# Patient Record
Sex: MALE | Race: WHITE | Employment: STUDENT | URBAN - METROPOLITAN AREA
[De-identification: names, ages, dates, MRNs, and addresses within clinical notes are randomized per-mention and may not be internally consistent; named-entity substitution may affect disease eponyms.]

---

## 2018-05-31 ENCOUNTER — OFFICE VISIT (OUTPATIENT)
Dept: PEDIATRICS | Facility: CLINIC | Age: 23
End: 2018-05-31
Payer: COMMERCIAL

## 2018-05-31 VITALS
WEIGHT: 154 LBS | BODY MASS INDEX: 22.05 KG/M2 | TEMPERATURE: 98.5 F | SYSTOLIC BLOOD PRESSURE: 100 MMHG | DIASTOLIC BLOOD PRESSURE: 60 MMHG | OXYGEN SATURATION: 96 % | HEART RATE: 87 BPM | HEIGHT: 70 IN

## 2018-05-31 DIAGNOSIS — Z00.01 ENCOUNTER FOR ROUTINE ADULT HEALTH EXAMINATION WITH ABNORMAL FINDINGS: Primary | ICD-10-CM

## 2018-05-31 DIAGNOSIS — R09.82 POST-NASAL DRIP: ICD-10-CM

## 2018-05-31 DIAGNOSIS — J30.2 SEASONAL ALLERGIC RHINITIS, UNSPECIFIED CHRONICITY, UNSPECIFIED TRIGGER: ICD-10-CM

## 2018-05-31 DIAGNOSIS — Z13.6 CARDIOVASCULAR SCREENING; LDL GOAL LESS THAN 160: ICD-10-CM

## 2018-05-31 DIAGNOSIS — D36.7 DERMOID CYST OF LEG, LEFT: ICD-10-CM

## 2018-05-31 DIAGNOSIS — J45.20 MILD INTERMITTENT ASTHMA WITHOUT COMPLICATION: ICD-10-CM

## 2018-05-31 PROCEDURE — 99385 PREV VISIT NEW AGE 18-39: CPT | Performed by: INTERNAL MEDICINE

## 2018-05-31 PROCEDURE — 99213 OFFICE O/P EST LOW 20 MIN: CPT | Mod: 25 | Performed by: INTERNAL MEDICINE

## 2018-05-31 RX ORDER — FLUTICASONE PROPIONATE 50 MCG
1 SPRAY, SUSPENSION (ML) NASAL DAILY
Qty: 1 BOTTLE | Refills: 3 | Status: SHIPPED | OUTPATIENT
Start: 2018-05-31

## 2018-05-31 RX ORDER — MONTELUKAST SODIUM 10 MG/1
10 TABLET ORAL AT BEDTIME
COMMUNITY
End: 2020-03-31

## 2018-05-31 ASSESSMENT — ENCOUNTER SYMPTOMS
DIZZINESS: 0
COUGH: 0
HEARTBURN: 0
CONSTIPATION: 0
PARESTHESIAS: 0
PALPITATIONS: 0
HEMATURIA: 0
ABDOMINAL PAIN: 0
NERVOUS/ANXIOUS: 0
DIARRHEA: 0
CHILLS: 0
JOINT SWELLING: 0
MYALGIAS: 0
HEMATOCHEZIA: 0
HEADACHES: 0
DYSURIA: 0
EYE PAIN: 0
NAUSEA: 0
ARTHRALGIAS: 0
SHORTNESS OF BREATH: 0
WEAKNESS: 0
FEVER: 0
SORE THROAT: 0
FREQUENCY: 0

## 2018-05-31 NOTE — PATIENT INSTRUCTIONS
INSTRUCTIONS FOR TODAY:     cyst-schedule visit with Surgery   post nasal drip-continue Allegra, add flonase   schedule fasting labdraw       Dr Noble    Preventive Health Recommendations  Male Ages 18 - 25     Yearly exam:             See your health care provider every year in order to  o   Review health changes.   o   Discuss preventive care.    o   Review your medicines if your doctor has prescribed any.    You should be tested each year for STDs (sexually transmitted diseases).     Talk to your provider about cholesterol testing.      If you are at risk for diabetes, you should have a diabetes test (fasting glucose).    Shots: Get a flu shot each year. Get a tetanus shot every 10 years.     Nutrition:    Eat at least 5 servings of fruits and vegetables daily.     Eat whole-grain bread, whole-wheat pasta and brown rice instead of white grains and rice.     Talk to your provider about calcium and Vitamin D.     Lifestyle    Exercise for at least 150 minutes a week (30 minutes a day, 5 days a week). This will help you control your weight and prevent disease.     Limit alcohol to one drink per day.     No smoking.     Wear sunscreen to prevent skin cancer.     See your dentist every six months for an exam and cleaning.

## 2018-05-31 NOTE — LETTER
My Asthma Action Plan  Name: Walter Mejia   YOB: 1995  Date: 5/31/2018   My doctor: Ezekiel Noble MD, MD   My clinic: [facility] [place]        My Control Medicine: none  My Rescue Medicine: albuterol   My Asthma Severity: intermittent  your asthma triggers: exercise               GREEN ZONE   Good Control    I feel good    No cough or wheeze    Can work, sleep and play without asthma symptoms       Take your asthma control medicine every day.     1. If exercise triggers your asthma, take your rescue medication    15 minutes before exercise or sports, and    During exercise if you have asthma symptoms  2. Spacer to use with inhaler: If you have a spacer, make sure to use it with your inhaler             YELLOW ZONE Getting Worse  I have ANY of these:    I do not feel good    Cough or wheeze    Chest feels tight    Wake up at night   1. Keep taking your Green Zone medications  2. Start taking your rescue medicine:    every 20 minutes for up to 1 hour. Then every 4 hours for 24-48 hours.  3. If you stay in the Yellow Zone for more than 12-24 hours, contact your doctor.  4. If you do not return to the Green Zone in 12-24 hours or you get worse, start taking your oral steroid medicine if prescribed by your provider.           RED ZONE Medical Alert - Get Help  I have ANY of these:    I feel awful    Medicine is not helping    Breathing getting harder    Trouble walking or talking    Nose opens wide to breathe       1. Take your rescue medicine NOW  2. If your provider has prescribed an oral steroid medicine, start taking it NOW  3. Call your doctor NOW  4. If you are still in the Red Zone after 20 minutes and you have not reached your doctor:    Take your rescue medicine again and    Call 911 or go to the emergency room right away    See your regular doctor within 2 weeks of an Emergency Room or Urgent Care visit for follow-up treatment.          Annual Reminders:  Meet with Asthma Educator,  Flu Shot  in the Fall, consider Pneumonia Vaccination for patients with asthma (aged 19 and older).    Pharmacy:    [id number] - [place], [place] - [address] [id number], [place] - [address] AT [facility] & [place] [address]                      Asthma Triggers  How To Control Things That Make Your Asthma Worse    Triggers are things that make your asthma worse.  Look at the list below to help you find your triggers and what you can do about them.  You can help prevent asthma flare-ups by staying away from your triggers.      Trigger                                                          What you can do   Cigarette Smoke  Tobacco smoke can make asthma worse. Do not allow smoking in your home, car or around you.  Be sure no one smokes at a child s day care or school.  If you smoke, ask your health care provider for ways to help you quit.  Ask family members to quit too.  Ask your health care provider for a referral to Quit Plan to help you quit smoking, or call 9-715-446-PLAN.     Colds, Flu, Bronchitis  These are common triggers of asthma. Wash your hands often.  Don t touch your eyes, nose or mouth.  Get a flu shot every year.     Dust Mites  These are tiny bugs that live in cloth or carpet. They are too small to see. Wash sheets and blankets in hot water every week.   Encase pillows and mattress in dust mite proof covers.  Avoid having carpet if you can. If you have carpet, vacuum weekly.   Use a dust mask and HEPA vacuum.   Pollen and Outdoor Mold  Some people are allergic to trees, grass, or weed pollen, or molds. Try to keep your windows closed.  Limit time out doors when pollen count is high.   Ask you health care provider about taking medicine during allergy season.     Animal Dander  Some people are allergic to skin flakes, urine or saliva from pets with fur or feathers. Keep pets with fur or feathers out of your home.    If you can t  keep the pet outdoors, then keep the pet out of your bedroom.  Keep the bedroom door closed.  Keep pets off cloth furniture and away from stuffed toys.     Mice, Rats, and Cockroaches  Some people are allergic to the waste from these pests.   Cover food and garbage.  Clean up spills and food crumbs.  Store grease in the refrigerator.   Keep food out of the bedroom.   Indoor Mold  This can be a trigger if your home has high moisture. Fix leaking faucets, pipes, or other sources of water.   Clean moldy surfaces.  Dehumidify basement if it is damp and smelly.   Smoke, Strong Odors, and Sprays  These can reduce air quality. Stay away from strong odors and sprays, such as perfume, powder, hair spray, paints, smoke incense, paint, cleaning products, candles and new carpet.   Exercise or Sports  Some people with asthma have this trigger. Be active!  Ask your doctor about taking medicine before sports or exercise to prevent symptoms.    Warm up for 5-10 minutes before and after sports or exercise.     Other Triggers of Asthma  Cold air:  Cover your nose and mouth with a scarf.  Sometimes laughing or crying can be a trigger.  Some medicines and food can trigger asthma.

## 2018-05-31 NOTE — PROGRESS NOTES
SUBJECTIVE:   CC: Walter Mejia is an 22 year old male who presents for preventative health visit.     Physical   Annual:     Getting at least 3 servings of Calcium per day::  Yes    Bi-annual eye exam::  Yes    Dental care twice a year::  NO    Sleep apnea or symptoms of sleep apnea::  None    Diet::  Regular (no restrictions)    Frequency of exercise::  4-5 days/week    Duration of exercise::  45-60 minutes    Taking medications regularly::  Yes    Medication side effects::  None    Additional concerns today::  YES            Additional concerns today:  1- lump on lower leg present for several years.  Denies associated trauma.  The lesion is not painful and has not increased in size.  2- complains of ongoing problems with phlegm in the back of his throat over the past 1-2 months.  Prior history of asthma and allergic rhinitis.  Has continued fexofenadine and Singulair without much improvement.  3-history of intermittent asthma.  Uses albuterol as needed prior to exercise, infrequent exacerbations.    Today's PHQ-2 Score:   PHQ-2 ( 1999 Pfizer) 5/31/2018   Q1: Little interest or pleasure in doing things 0   Q2: Feeling down, depressed or hopeless 0   PHQ-2 Score 0   Q1: Little interest or pleasure in doing things Not at all   Q2: Feeling down, depressed or hopeless Not at all   PHQ-2 Score 0       Abuse: Current or Past(Physical, Sexual or Emotional)- No  Do you feel safe in your environment - Yes    Social History   Substance Use Topics     Smoking status: Never Smoker     Smokeless tobacco: Never Used     Alcohol use Yes     Alcohol Use 5/31/2018   If you drink alcohol do you typically have greater than 3 drinks per day OR greater than 7 drinks per week? No     Reviewed orders with patient. Reviewed health maintenance and updated orders accordingly - Yes      Reviewed and updated as needed this visit by clinical staff  Tobacco  Allergies  Meds  Soc Hx        Reviewed and updated as needed this visit by  "Provider          Patient Active Problem List   Diagnosis     CARDIOVASCULAR SCREENING; LDL GOAL LESS THAN 160     Seasonal allergic rhinitis, unspecified chronicity, unspecified trigger     Mild intermittent asthma without complication     No past medical history on file.    No past surgical history on file.    Family History   Problem Relation Age of Onset     Hyperlipidemia Father      Coronary Artery Disease No family hx of      Colon Cancer No family hx of      Prostate Cancer No family hx of        ALLERGIES     Allergies   Allergen Reactions     Augmentin      Upset stomach       Review of Systems   Constitutional: Negative for chills and fever.   HENT: Positive for congestion. Negative for ear pain, hearing loss and sore throat.    Eyes: Negative for pain and visual disturbance.   Respiratory: Negative for cough and shortness of breath.    Cardiovascular: Negative for chest pain, palpitations and peripheral edema.   Gastrointestinal: Negative for abdominal pain, constipation, diarrhea, heartburn, hematochezia and nausea.   Genitourinary: Negative for discharge, dysuria, frequency, genital sores, hematuria, impotence and urgency.   Musculoskeletal: Negative for arthralgias, joint swelling and myalgias.   Skin: Negative for rash.   Neurological: Negative for dizziness, weakness, headaches and paresthesias.   Psychiatric/Behavioral: Negative for mood changes. The patient is not nervous/anxious.          OBJECTIVE:   /60 (Cuff Size: Adult Regular)  Pulse 87  Temp 98.5  F (36.9  C) (Oral)  Ht 5' 10\" (1.778 m)  Wt 154 lb (69.9 kg)  SpO2 96%  BMI 22.1 kg/m2    Physical Exam  GENERAL: alert and no distress  EYES: Eyes grossly normal to inspection, PERRL and conjunctivae and sclerae normal  HENT: ear canals and TM's normal, nose and mouth without ulcers or lesions  NECK: no adenopathy, no asymmetry, masses, or scars and thyroid normal to palpation  RESP: lungs clear to auscultation - no rales, rhonchi or " "wheezes  CV: regular rate and rhythm, normal S1 S2, no S3 or S4, no murmur, click or rub, no peripheral edema and peripheral pulses strong  ABDOMEN: soft, nontender, no hepatosplenomegaly, no masses and bowel sounds normal  MS: no gross musculoskeletal defects noted, no edema  SKIN: no suspicious rashes, lower leg: Approximately 2 cm nontender mobile subcutaneous mass, nonfluctuant without erythema  NEURO: Normal strength and tone, mentation intact and speech normal  PSYCH: mentation appears normal, affect normal/bright    ASSESSMENT/PLAN:       ICD-10-CM    1. Encounter for routine adult health examination with abnormal findings Z00.01        2. Post-nasal drip R09.82 fluticasone (FLONASE) 50 MCG/ACT spray      Suspect postnasal drip.  Continue fexofenadine, add fluticasone     3. Dermoid cyst of leg, left D23.72 GENERAL SURG ADULT REFERRAL      Suspect sebaceous versus dermoid cyst.  Diagnosis discussed, referred to general surgery for possible excision     4. Mild intermittent asthma without complication J45.20  continue albuterol as needed, discussed indications for adding inhaled steroid.     5. Seasonal allergic rhinitis, unspecified chronicity, unspecified trigger J30.2  continue Singulair       6. CARDIOVASCULAR SCREENING; LDL GOAL LESS THAN 160 Z13.6 Lipid panel reflex to direct LDL Fasting     Basic metabolic panel       COUNSELING:   Reviewed preventive health counseling, as reflected in patient instructions       Regular exercise       Healthy diet/nutrition       Colon cancer screening       Prostate cancer screening         reports that he has never smoked. He has never used smokeless tobacco.    Estimated body mass index is 22.1 kg/(m^2) as calculated from the following:    Height as of this encounter: 5' 10\" (1.778 m).    Weight as of this encounter: 154 lb (69.9 kg).       Counseling Resources:  ATP IV Guidelines  Pooled Cohorts Equation Calculator  FRAX Risk Assessment  ICSI Preventive " Guidelines  Dietary Guidelines for Americans, 2010  USDA's MyPlate  ASA Prophylaxis  Lung CA Screening    Ezekiel Noble MD, MD  [facility]

## 2018-05-31 NOTE — TELEPHONE ENCOUNTER
Duplicate.     fluticasone (FLONASE) 50 MCG/ACT spray was filled on 5/31/2018, qty 1 bottle with 3 refills.

## 2018-05-31 NOTE — MR AVS SNAPSHOT
After Visit Summary   5/31/2018    Waltre Mejia    MRN: [id number]           Patient Information     Date Of Birth          1995        Visit Information        Provider Department      5/31/2018 11:20 AM Ezekiel Noble MD [facility]        Today's Diagnoses     Encounter for routine adult health examination with abnormal findings    -  1    CARDIOVASCULAR SCREENING; LDL GOAL LESS THAN 160        Mild intermittent asthma without complication        Seasonal allergic rhinitis, unspecified chronicity, unspecified trigger        Post-nasal drip        Dermoid cyst of leg, left          Care Instructions    INSTRUCTIONS FOR TODAY:     cyst-schedule visit with Surgery   post nasal drip-continue Allegra, add flonase   schedule fasting labdraw       Dr Noble    Preventive Health Recommendations  Male Ages 18 - 25     Yearly exam:             See your health care provider every year in order to  o   Review health changes.   o   Discuss preventive care.    o   Review your medicines if your doctor has prescribed any.    You should be tested each year for STDs (sexually transmitted diseases).     Talk to your provider about cholesterol testing.      If you are at risk for diabetes, you should have a diabetes test (fasting glucose).    Shots: Get a flu shot each year. Get a tetanus shot every 10 years.     Nutrition:    Eat at least 5 servings of fruits and vegetables daily.     Eat whole-grain bread, whole-wheat pasta and brown rice instead of white grains and rice.     Talk to your provider about calcium and Vitamin D.     Lifestyle    Exercise for at least 150 minutes a week (30 minutes a day, 5 days a week). This will help you control your weight and prevent disease.     Limit alcohol to one drink per day.     No smoking.     Wear sunscreen to prevent skin cancer.     See your dentist every six months for an exam and cleaning.             Follow-ups after your visit        Additional  Services     GENERAL SURG ADULT REFERRAL       Your provider has referred you to: [facility]: [facility] - [place] [phone]   [email or web address]    Please be aware that coverage of these services is subject to the terms and limitations of your health insurance plan.  Call member services at your health plan with any benefit or coverage questions.      Please bring the following with you to your appointment:    (1) Any X-Rays, CTs or MRIs which have been performed.  Contact the facility where they were done to arrange for  prior to your scheduled appointment.   (2) List of current medications   (3) This referral request   (4) Any documents/labs given to you for this referral                  Future tests that were ordered for you today     Open Future Orders        Priority Expected Expires Ordered    Lipid panel reflex to direct LDL Fasting Routine  7/31/2018 5/31/2018    Basic metabolic panel Routine  7/31/2018 5/31/2018            Who to contact     If you have questions or need follow up information about today's clinic visit or your schedule please contact [facility] [place] directly at [phone].  Normal or non-critical lab and imaging results will be communicated to you by MyChart, letter or phone within 4 business days after the clinic has received the results. If you do not hear from us within 7 days, please contact the clinic through MyChart or phone. If you have a critical or abnormal lab result, we will notify you by phone as soon as possible.  Submit refill requests through [employer] or call your pharmacy and they will forward the refill request to us. Please allow 3 business days for your refill to be completed.          Additional Information About Your Visit        Care EveryWhere ID     This is your Care EveryWhere ID. This could be used by other organizations to access your [facility] medical records  [id number]        Your Vitals  "Were     Pulse Temperature Height Pulse Oximetry BMI (Body Mass Index)       87 98.5  F (36.9  C) (Oral) 5' 10\" (1.778 m) 96% 22.1 kg/m2        Blood Pressure from Last 3 Encounters:   05/31/18 100/60   10/17/15 104/83    Weight from Last 3 Encounters:   05/31/18 154 lb (69.9 kg)   03/13/08 111 lb 8 oz (50.6 kg) (53 %)*   08/12/07 98 lb 8 oz (44.7 kg) (41 %)*     * Growth percentiles are based on [facility] 2-20 Years data.              We Performed the Following     GENERAL SURG ADULT REFERRAL          Today's Medication Changes          These changes are accurate as of 5/31/18 11:53 AM.  If you have any questions, ask your nurse or doctor.               Start taking these medicines.        Dose/Directions    fluticasone 50 MCG/ACT spray   Commonly known as:  FLONASE   Used for:  Post-nasal drip   Started by:  Ezekiel Noble MD        Dose:  1 spray   Spray 1 spray into both nostrils daily   Quantity:  1 Bottle   Refills:  3         These medicines have changed or have updated prescriptions.        Dose/Directions    montelukast 10 MG tablet   Commonly known as:  SINGULAIR   This may have changed:  Another medication with the same name was removed. Continue taking this medication, and follow the directions you see here.   Changed by:  Ezekiel Noble MD        Dose:  10 mg   Take 10 mg by mouth At Bedtime   Refills:  0         Stop taking these medicines if you haven't already. Please contact your care team if you have questions.     azithromycin 500 MG tablet   Commonly known as:  ZITHROMAX   Stopped by:  Ezekiel Noble MD           CLARITIN PO   Stopped by:  Ezekiel Noble MD           FLOVENT IN   Stopped by:  Ezekiel Noble MD           IBUPROFEN   Stopped by:  Ezekiel Noble MD           MOTRIN PO   Stopped by:  Ezekiel Noble MD           TYLENOL 325 MG tablet   Generic drug:  acetaminophen   Stopped by:  Ezekiel Noble MD                Where to get your medicines    "   These medications were sent to [id number] - [place], [place] - [address] AT [address]  [address] [place] [address]     Phone:  [phone]     fluticasone 50 MCG/ACT spray                Primary Care Provider Fax #    Physician No Ref-Primary [phone]       No address on file        Equal Access to Services     [facility]: Hadii aad ku hadasho Soomaali, waaxda luqadaha, qaybta kaalmada adeegyada, waxay idiin hayaan adeeg kharash la'aan . So [facility] [phone].    ATENCIÓN: Si habla español, tiene a valladares disposición servicios gratuitos de asistencia lingüística. [facility] [phone].    We comply with applicable federal civil rights laws and [place] laws. We do not discriminate on the basis of race, color, national origin, age, disability, sex, sexual orientation, or gender identity.            Thank you!     Thank you for choosing [facility]  for your care. Our goal is always to provide you with excellent care. Hearing back from our patients is one way we can continue to improve our services. Please take a few minutes to complete the written survey that you may receive in the mail after your visit with us. Thank you!             Your Updated Medication List - Protect others around you: Learn how to safely use, store and throw away your medicines at www.disposemymeds.org.          This list is accurate as of 5/31/18 11:53 AM.  Always use your most recent med list.                   Brand Name Dispense Instructions for use Diagnosis    Albuterol Sulfate 108 (90 Base) MCG/ACT Aepb           fluticasone 50 MCG/ACT spray    FLONASE    1 Bottle    Spray 1 spray into both nostrils daily    Post-nasal drip       montelukast 10 MG tablet    SINGULAIR     Take 10 mg by mouth At Bedtime

## 2018-06-05 RX ORDER — FLUTICASONE PROPIONATE 50 MCG
SPRAY, SUSPENSION (ML) NASAL
Qty: 48 ML | Refills: 3 | OUTPATIENT
Start: 2018-06-05

## 2018-06-07 DIAGNOSIS — Z13.6 CARDIOVASCULAR SCREENING; LDL GOAL LESS THAN 160: ICD-10-CM

## 2018-06-07 LAB
ANION GAP SERPL CALCULATED.3IONS-SCNC: 4 MMOL/L (ref 3–14)
BUN SERPL-MCNC: 15 MG/DL (ref 7–30)
CALCIUM SERPL-MCNC: 9.1 MG/DL (ref 8.5–10.1)
CHLORIDE SERPL-SCNC: 106 MMOL/L (ref 94–109)
CHOLEST SERPL-MCNC: 177 MG/DL
CO2 SERPL-SCNC: 31 MMOL/L (ref 20–32)
CREAT SERPL-MCNC: 0.91 MG/DL (ref 0.66–1.25)
GFR SERPL CREATININE-BSD FRML MDRD: >90 ML/MIN/1.7M2
GLUCOSE SERPL-MCNC: 94 MG/DL (ref 70–99)
HDLC SERPL-MCNC: 37 MG/DL
LDLC SERPL CALC-MCNC: 118 MG/DL
NONHDLC SERPL-MCNC: 140 MG/DL
POTASSIUM SERPL-SCNC: 3.9 MMOL/L (ref 3.4–5.3)
SODIUM SERPL-SCNC: 141 MMOL/L (ref 133–144)
TRIGL SERPL-MCNC: 112 MG/DL

## 2018-06-07 PROCEDURE — 80048 BASIC METABOLIC PNL TOTAL CA: CPT | Performed by: INTERNAL MEDICINE

## 2018-06-07 PROCEDURE — 36415 COLL VENOUS BLD VENIPUNCTURE: CPT | Performed by: INTERNAL MEDICINE

## 2018-06-07 PROCEDURE — 80061 LIPID PANEL: CPT | Performed by: INTERNAL MEDICINE

## 2018-06-15 ENCOUNTER — OFFICE VISIT (OUTPATIENT)
Dept: SURGERY | Facility: CLINIC | Age: 23
End: 2018-06-15
Payer: COMMERCIAL

## 2018-06-15 ENCOUNTER — TELEPHONE (OUTPATIENT)
Dept: SURGERY | Facility: CLINIC | Age: 23
End: 2018-06-15

## 2018-06-15 VITALS
BODY MASS INDEX: 22.19 KG/M2 | HEART RATE: 87 BPM | OXYGEN SATURATION: 96 % | RESPIRATION RATE: 16 BRPM | WEIGHT: 155 LBS | SYSTOLIC BLOOD PRESSURE: 100 MMHG | HEIGHT: 70 IN | DIASTOLIC BLOOD PRESSURE: 62 MMHG

## 2018-06-15 DIAGNOSIS — R22.42 LOWER LEG MASS, LEFT: Primary | ICD-10-CM

## 2018-06-15 PROCEDURE — 99203 OFFICE O/P NEW LOW 30 MIN: CPT | Performed by: SURGERY

## 2018-06-15 NOTE — PROGRESS NOTES
I'm asked to see this patient by Dr. Ezekiel Noble regarding a lump on his left lower leg.  This has been present for about a year.  It does not cause pain.  The patient is not aware of a changing in size.  He is not sure if it was this size when it first arose.  It has never been inflamed.  The patient does have a history of asthma.    Past Medical History:   Diagnosis Date     Uncomplicated asthma      Past Surgical History:   Procedure Laterality Date     TONSILLECTOMY  2008     Social History     Social History     Marital status: Single     Spouse name: N/A     Number of children: N/A     Years of education: N/A     Occupational History     Not on file.     Social History Main Topics     Smoking status: Never Smoker     Smokeless tobacco: Never Used     Alcohol use Yes     Drug use: No     Sexual activity: No     Other Topics Concern     Not on file     Social History Narrative     10 point review of systems is negative other than those issues noted above.    Physical exam:  The patient is in no apparent distress.  Breathing is nonlabored.  Left lower extremity reveals a 2 cm raised lump.  The overlying skin is essentially normal.  There is no identifiable punctum.  This is a bit firmer than would be expected from a lipoma or fatty mass.    Assessment and plan: This is a patient with an apparent solid mass in the left lateral lower leg.  This hasn't been changing significantly, and very likely represents a benign lesion, but a locally recurrent neoplasm needs to be ruled out.  I have recommended excision under local anesthesia.  We will work on getting this scheduled for the patient in the near future.  We discussed the procedure, along with its risks and complications, in detail.  The patient has agreed to proceed.    Adalberto Stein MD  [facility]    Please route or send letter to:  Primary Care Provider (PCP)

## 2018-06-15 NOTE — MR AVS SNAPSHOT
"              After Visit Summary   6/15/2018    Walter Mejia    MRN: [id number]           Patient Information     Date Of Birth          1995        Visit Information        Provider Department      6/15/2018 9:45 AM Adalberto Stein MD [facility] [place] [facility] General Surgery      Today's Diagnoses     Lower leg mass, left    -  1       Follow-ups after your visit        Your next 10 appointments already scheduled     Jun 26, 2018   Procedure with Adalberto Stein MD   [facility] PeriOp Services (--)    [address]  [facility] [address]   [phone]            Jun 26, 2018 12:30 PM CDT   [facility] Same Day Surgery with Adalberto Stein MD   [facility] ([facility])    [facility] ([facility])   [phone]              Who to contact     If you have questions or need follow up information about today's clinic visit or your schedule please contact [facility] [place] directly at [phone].  Normal or non-critical lab and imaging results will be communicated to you by MyChart, letter or phone within 4 business days after the clinic has received the results. If you do not hear from us within 7 days, please contact the clinic through MyChart or phone. If you have a critical or abnormal lab result, we will notify you by phone as soon as possible.  Submit refill requests through [employer] or call your pharmacy and they will forward the refill request to us. Please allow 3 business days for your refill to be completed.          Additional Information About Your Visit        Care EveryWhere ID     This is your Care EveryWhere ID. This could be used by other organizations to access your [place] medical records  [id number]        Your Vitals Were     Pulse Respirations Height Pulse Oximetry BMI (Body Mass Index)       87 16 5' 10\" (1.778 m) 96% 22.24 " kg/m2        Blood Pressure from Last 3 Encounters:   06/15/18 100/62   05/31/18 100/60   10/17/15 104/83    Weight from Last 3 Encounters:   06/15/18 155 lb (70.3 kg)   05/31/18 154 lb (69.9 kg)   03/13/08 111 lb 8 oz (50.6 kg) (53 %)*     * Growth percentiles are based on [facility] 2-20 Years data.              Today, you had the following     No orders found for display       Primary Care Provider Office Phone # Fax #    Ezekiel Noble [phone] [address] [place] [address]        Equal Access to Services     [facility]: Hadii aad pema hadasho Sodav, waaxda luqadaha, qaybta kaalmada aderaisayada, john stewart . So [facility] [phone].    ATENCIÓN: Si habla español, tiene a valladares disposición servicios gratuitos de asistencia lingüística. Llame al [phone].    We comply with applicable federal civil rights laws and [place] laws. We do not discriminate on the basis of race, color, national origin, age, disability, sex, sexual orientation, or gender identity.            Thank you!     Thank you for choosing [facility] [place]  for your care. Our goal is always to provide you with excellent care. Hearing back from our patients is one way we can continue to improve our services. Please take a few minutes to complete the written survey that you may receive in the mail after your visit with us. Thank you!             Your Updated Medication List - Protect others around you: Learn how to safely use, store and throw away your medicines at www.disposemymeds.org.          This list is accurate as of 6/15/18 10:02 AM.  Always use your most recent med list.                   Brand Name Dispense Instructions for use Diagnosis    Albuterol Sulfate 108 (90 Base) MCG/ACT Aepb           fluticasone 50 MCG/ACT spray    FLONASE    1 Bottle    Spray 1 spray into both nostrils daily    Post-nasal drip       montelukast 10 MG tablet    SINGULAIR     Take  10 mg by mouth At Bedtime

## 2018-06-15 NOTE — LETTER
Stephanie 15, 2018    Re: Walter Mejia, : 1995    I'm asked to see this patient by Dr. Ezekiel Noble regarding a lump on his left lower leg.  This has been present for about a year.  It does not cause pain.  The patient is not aware of a changing in size.  He is not sure if it was this size when it first arose.  It has never been inflamed.  The patient does have a history of asthma.     Physical exam:  The patient is in no apparent distress.  Breathing is nonlabored.  Left lower extremity reveals a 2 cm raised lump.  The overlying skin is essentially normal.  There is no identifiable punctum.  This is a bit firmer than would be expected from a lipoma or fatty mass.     Assessment and plan: This is a patient with an apparent solid mass in the left lateral lower leg.  This hasn't been changing significantly, and very likely represents a benign lesion, but a locally recurrent neoplasm needs to be ruled out.  I have recommended excision under local anesthesia.  We will work on getting this scheduled for the patient in the near future.  We discussed the procedure, along with its risks and complications, in detail.  The patient has agreed to proceed.     Adalberto Stein MD  [facility]

## 2018-06-26 ENCOUNTER — HOSPITAL ENCOUNTER (OUTPATIENT)
Facility: CLINIC | Age: 23
Discharge: HOME | End: 2018-06-26
Attending: SURGERY | Admitting: SURGERY
Payer: COMMERCIAL

## 2018-06-26 ENCOUNTER — SURGERY (OUTPATIENT)
Age: 23
End: 2018-06-26

## 2018-06-26 ENCOUNTER — APPOINTMENT (OUTPATIENT)
Dept: SURGERY | Facility: PHYSICIAN GROUP | Age: 23
End: 2018-06-26
Payer: COMMERCIAL

## 2018-06-26 VITALS
DIASTOLIC BLOOD PRESSURE: 66 MMHG | TEMPERATURE: 99 F | HEIGHT: 70 IN | WEIGHT: 155 LBS | HEART RATE: 71 BPM | BODY MASS INDEX: 22.19 KG/M2 | SYSTOLIC BLOOD PRESSURE: 113 MMHG | OXYGEN SATURATION: 97 % | RESPIRATION RATE: 14 BRPM

## 2018-06-26 DIAGNOSIS — R22.42 LEG MASS, LEFT: Primary | ICD-10-CM

## 2018-06-26 PROCEDURE — 36000050 ZZH SURGERY LEVEL 2 1ST 30 MIN: Performed by: SURGERY

## 2018-06-26 PROCEDURE — 40000305 ZZH STATISTIC PRE PROC ASSESS I: Performed by: SURGERY

## 2018-06-26 PROCEDURE — 27210794 ZZH OR GENERAL SUPPLY STERILE: Performed by: SURGERY

## 2018-06-26 PROCEDURE — 36000052 ZZH SURGERY LEVEL 2 EA 15 ADDTL MIN: Performed by: SURGERY

## 2018-06-26 PROCEDURE — 11402 EXC TR-EXT B9+MARG 1.1-2 CM: CPT | Performed by: SURGERY

## 2018-06-26 PROCEDURE — 25000125 ZZHC RX 250: Performed by: SURGERY

## 2018-06-26 PROCEDURE — 71000027 ZZH RECOVERY PHASE 2 EACH 15 MINS: Performed by: SURGERY

## 2018-06-26 PROCEDURE — 88304 TISSUE EXAM BY PATHOLOGIST: CPT | Performed by: SURGERY

## 2018-06-26 PROCEDURE — 88304 TISSUE EXAM BY PATHOLOGIST: CPT | Mod: 26 | Performed by: SURGERY

## 2018-06-26 RX ORDER — HYDROCODONE BITARTRATE AND ACETAMINOPHEN 5; 325 MG/1; MG/1
1 TABLET ORAL
Status: CANCELLED | OUTPATIENT
Start: 2018-06-26

## 2018-06-26 RX ORDER — HYDROCODONE BITARTRATE AND ACETAMINOPHEN 5; 325 MG/1; MG/1
1-2 TABLET ORAL EVERY 4 HOURS PRN
Qty: 10 TABLET | Refills: 0 | Status: SHIPPED | OUTPATIENT
Start: 2018-06-26 | End: 2019-06-11

## 2018-06-26 RX ADMIN — BUPIVACAINE HYDROCHLORIDE 3 ML: 5 INJECTION, SOLUTION EPIDURAL; INTRACAUDAL at 15:06

## 2018-06-26 NOTE — IP AVS SNAPSHOT
[facility] PreOP/PostOP    [address]    [facility] [address]    Phone:  [phone]    Fax:  [phone]                                       After Visit Summary   6/26/2018    Walter Mejia    MRN: [id number]           After Visit Summary Signature Page     I have received my discharge instructions, and my questions have been answered. I have discussed any challenges I see with this plan with the nurse or doctor.    ..........................................................................................................................................  Patient/Patient Representative Signature      ..........................................................................................................................................  Patient Representative Print Name and Relationship to Patient    ..................................................               ................................................  Date                                            Time    ..........................................................................................................................................  Reviewed by Signature/Title    ...................................................              ..............................................  Date                                                            Time

## 2018-06-26 NOTE — IP AVS SNAPSHOT
MRN:[id number]                      After Visit Summary   6/26/2018    Walter Mejia    MRN: [id number]           Thank you!     Thank you for choosing [facility] for your care. Our goal is always to provide you with excellent care. Hearing back from our patients is one way we can continue to improve our services. Please take a few minutes to complete the written survey that you may receive in the mail after you visit. If you would like to speak to someone directly about your visit please contact Patient Relations at [phone]. Thank you!          Patient Information     Date Of Birth          1995        About your hospital stay     You were admitted on:  June 26, 2018 You last received care in the:  [facility] PreOP/PostOP    You were discharged on:  June 26, 2018       Who to Call     For medical emergencies, please call 911.  For non-urgent questions about your medical care, please call your primary care provider or clinic, [phone]  For questions related to your surgery, please call your surgery clinic        Attending Provider     Provider Specialty    Adalberto Stein MD General Surgery       Primary Care Provider Office Phone # Fax #    Ezekiel Noble [phone]      Further instructions from your care team       HOME CARE FOLLOWING MINOR SURGERY        DRESSING  Keep dressing dry and in place until your doctor instructs you to remove the dressing.    DRAINAGE  There should be minimal drainage. If bleeding should occur and soaks through the dressing apply a sterile, dry dressing over it and tape in place. If bleeding persists, apply gentle, steady pressure with your hand over the dressing for 5 minutes. If the bleeding does not stop, call your doctor.    SKIN CLOSURE  You may have stitches or special skin closures. You will be given an appointment for the removal of any external stitches. You may have stitches under the skin which will  "absorb. You may have steri-strips over the incision. These look like thin tapes and will peel off in 5-7 days. If they don t after 7 days, you may carefully remove them. You may shower with the steri-strips but do not soak them, as with swimming or taking a bath. A protective covering of plastic may be placed over external stitches for showering.    NOTIFY YOUR PHYSICIAN IF YOU HAVE ANY OF THE FOLLOWING SYMPTOMS:    1. Fever  2. Excessive bleeding or drainage  3. Disruption of the skin closure  4. Swelling, redness or excessive tenderness at the site  5. Severe pain  6. Drainage that is green, yellow, thick white or has a bad odor      Pending Results     Date and Time Order Name Status Description    6/26/2018 1505 Surgical pathology exam In process             Admission Information     Date & Time Provider Department Dept. Phone    6/26/2018 Adalberto Stein MD [facility] PreOP/PostOP [phone]      Your Vitals Were     Blood Pressure Pulse Temperature Respirations Height Weight    113/66 71 99  F (37.2  C) (Temporal) 14 1.778 m (5' 10\") 70.3 kg (155 lb)    Pulse Oximetry BMI (Body Mass Index)                97% 22.24 kg/m2          Care EveryWhere ID     This is your Care EveryWhere ID. This could be used by other organizations to access your [place] medical records  [id number]        Equal Access to Services     [facility]: Hadii aad ku hadasho Soomaali, waaxda luqadaha, qaybta kaalmada adeegyada, john gonzales. So [facility] [phone].    ATENCIÓN: Si habla español, tiene a valladares disposición servicios gratuitos de asistencia lingüística. Llame al [phone].    We comply with applicable federal civil rights laws and [place] laws. We do not discriminate on the basis of race, color, national origin, age, disability, sex, sexual orientation, or gender identity.               Review of your medicines      START taking        Dose / Directions    HYDROcodone-acetaminophen " 5-325 MG per tablet   Commonly known as:  NORCO   Used for:  Leg mass, left        Dose:  1-2 tablet   Take 1-2 tablets by mouth every 4 hours as needed for other (Moderate to Severe Pain)   Quantity:  10 tablet   Refills:  0         CONTINUE these medicines which have NOT CHANGED        Dose / Directions    Albuterol Sulfate 108 (90 Base) MCG/ACT Aepb        Refills:  0       FEXOFENADINE HCL PO        Refills:  0       fluticasone 50 MCG/ACT spray   Commonly known as:  FLONASE   Used for:  Post-nasal drip        Dose:  1 spray   Spray 1 spray into both nostrils daily   Quantity:  1 Bottle   Refills:  3       montelukast 10 MG tablet   Commonly known as:  SINGULAIR        Dose:  10 mg   Take 10 mg by mouth At Bedtime   Refills:  0            Where to get your medicines      Some of these will need a paper prescription and others can be bought over the counter. Ask your nurse if you have questions.     Bring a paper prescription for each of these medications     HYDROcodone-acetaminophen 5-325 MG per tablet                Protect others around you: Learn how to safely use, store and throw away your medicines at www.disposemymeds.org.        Information about OPIOIDS     PRESCRIPTION OPIOIDS: WHAT YOU NEED TO KNOW   We gave you an opioid (narcotic) pain medicine. It is important to manage your pain, but opioids are not always the best choice. You should first try all the other options your care team gave you. Take this medicine for as short a time (and as few doses) as possible.     These medicines have risks:    DO NOT drive when on new or higher doses of pain medicine. These medicines can affect your alertness and reaction times, and you could be arrested for driving under the influence (DUI). If you need to use opioids long-term, talk to your care team about driving.    DO NOT operate heave machinery    DO NOT do any other dangerous activities while taking these medicines.     DO NOT drink any alcohol while taking  these medicines.      If the opioid prescribed includes acetaminophen, DO NOT take with any other medicines that contain acetaminophen. Read all labels carefully. Look for the word  acetaminophen  or  Tylenol.  Ask your pharmacist if you have questions or are unsure.    You can get addicted to pain medicines, especially if you have a history of addiction (chemical, alcohol or substance dependence). Talk to your care team about ways to reduce this risk.    Store your pills in a secure place, locked if possible. We will not replace any lost or stolen medicine. If you don t finish your medicine, please throw away (dispose) as directed by your pharmacist. The [place] Pollution Control Agency has more information about safe disposal: https://www.pca.[facility].[place].us/living-green/managing-unwanted-medications.     All opioids tend to cause constipation. Drink plenty of water and eat foods that have a lot of fiber, such as fruits, vegetables, prune juice, apple juice and high-fiber cereal. Take a laxative (Miralax, milk of magnesia, Colace, Senna) if you don t move your bowels at least every other day.              Medication List: This is a list of all your medications and when to take them. Check marks below indicate your daily home schedule. Keep this list as a reference.      Medications           Morning Afternoon Evening Bedtime As Needed    Albuterol Sulfate 108 (90 Base) MCG/ACT Aepb                                FEXOFENADINE HCL PO                                fluticasone 50 MCG/ACT spray   Commonly known as:  FLONASE   Spray 1 spray into both nostrils daily                                HYDROcodone-acetaminophen 5-325 MG per tablet   Commonly known as:  NORCO   Take 1-2 tablets by mouth every 4 hours as needed for other (Moderate to Severe Pain)                                montelukast 10 MG tablet   Commonly known as:  SINGULAIR   Take 10 mg by mouth At Bedtime

## 2018-06-26 NOTE — OP NOTE
General Surgery Operative Note    Pre-operative diagnosis: Left Lower Leg Mass   Post-operative diagnosis: Same   Procedure: Excision of left lower leg subcutaneous mass (2.5 cm)   Surgeon: Adalberto Stein MD   Assistant(s): NONE   Anesthesia: Local    Estimated blood loss: 1 cc   Drains placed: None   Complications:  None   Findings:  Well-circumscribed subcutaneous mass of uncertain etiology.  Excised without margins.     Indications for operation: This is a 23-year-old gentleman with a left lower leg mass which has been increasing in size.  Excision was recommended, and the procedure, along with its risks and complications, was discussed with the patient.  He agreed to proceed.    Details of the operation: After informed consent, the patient was taken to the operating room where he was sterilely prepped and draped in a left side up lateral position.  The area surrounding the patient's left lower leg mass was infiltrated with a 1:1 mixture of 0.5% Marcaine and 1% lidocaine with epinephrine.an elliptical incision was made over the posterior aspect of the mass.  Dissection was then carried down to the mass, which was very well circumscribed.  This was carefully dissected out from surrounding tissue, and no marginal tissue was taken.  It was felt that if this has any malignant potential, a wider excision would be required.  The specimen was removed and hemostasis was assured in the incision using electrocautery.  The subdermal tissues were approximated using interrupted 3-0 Vicryl sutures and the skin was closed using Dermabond skin adhesive.  Total incisional length was 2.5 cm.    The patient tolerated the procedure well and was transferred to the recovery room in satisfactory condition.  Sponge and needle counts were correct at the close of the case.    Specimens:   ID Type Source Tests Collected by Time Destination   A : Left Lower Leg Mass Tissue Leg Lower, Left SURGICAL PATHOLOGY EXAM Adalberto Stein MD  6/26/2018  3:05 PM            Adalberto Stein MD

## 2018-06-26 NOTE — DISCHARGE INSTRUCTIONS
HOME CARE FOLLOWING MINOR SURGERY        DRESSING  Keep dressing dry and in place until your doctor instructs you to remove the dressing.    DRAINAGE  There should be minimal drainage. If bleeding should occur and soaks through the dressing apply a sterile, dry dressing over it and tape in place. If bleeding persists, apply gentle, steady pressure with your hand over the dressing for 5 minutes. If the bleeding does not stop, call your doctor.    SKIN CLOSURE  You may have stitches or special skin closures. You will be given an appointment for the removal of any external stitches. You may have stitches under the skin which will absorb. You may have steri-strips over the incision. These look like thin tapes and will peel off in 5-7 days. If they don t after 7 days, you may carefully remove them. You may shower with the steri-strips but do not soak them, as with swimming or taking a bath. A protective covering of plastic may be placed over external stitches for showering.    NOTIFY YOUR PHYSICIAN IF YOU HAVE ANY OF THE FOLLOWING SYMPTOMS:    1. Fever  2. Excessive bleeding or drainage  3. Disruption of the skin closure  4. Swelling, redness or excessive tenderness at the site  5. Severe pain  6. Drainage that is green, yellow, thick white or has a bad odor

## 2018-06-27 LAB — COPATH REPORT: NORMAL

## 2019-01-01 NOTE — TELEPHONE ENCOUNTER
Type of surgery: LEFT LOWER LEG MASS   Location of surgery: [facility]  Date and time of surgery: 6-26-18 AT 12:30 PM   Surgeon: DR. MEJIA   Pre-Op Appt Date: PATIENT TO SCHEDULE   Post-Op Appt Date: PATIENT TO SCHEDULE    Packet sent out: GIVEN TO PATIENT   Pre-cert/Authorization completed:  Not Applicable  Date: 6-15-18         EXCISION LEFT LOWER LEG MASS   LOCAL   30 MINS REQ  NON   ALW  
01-Jan-2019 12:08

## 2019-06-11 ENCOUNTER — OFFICE VISIT (OUTPATIENT)
Dept: FAMILY MEDICINE | Facility: CLINIC | Age: 24
End: 2019-06-11
Payer: COMMERCIAL

## 2019-06-11 VITALS
HEART RATE: 69 BPM | RESPIRATION RATE: 18 BRPM | WEIGHT: 156.1 LBS | HEIGHT: 71 IN | DIASTOLIC BLOOD PRESSURE: 70 MMHG | SYSTOLIC BLOOD PRESSURE: 115 MMHG | OXYGEN SATURATION: 97 % | TEMPERATURE: 97.9 F | BODY MASS INDEX: 21.85 KG/M2

## 2019-06-11 DIAGNOSIS — Z00.01 ENCOUNTER FOR ROUTINE ADULT HEALTH EXAMINATION WITH ABNORMAL FINDINGS: Primary | ICD-10-CM

## 2019-06-11 DIAGNOSIS — J30.2 SEASONAL ALLERGIC RHINITIS, UNSPECIFIED TRIGGER: ICD-10-CM

## 2019-06-11 DIAGNOSIS — J45.20 MILD INTERMITTENT ASTHMA WITHOUT COMPLICATION: ICD-10-CM

## 2019-06-11 DIAGNOSIS — L30.1 DYSHIDROTIC ECZEMA: ICD-10-CM

## 2019-06-11 PROCEDURE — 99395 PREV VISIT EST AGE 18-39: CPT | Performed by: PHYSICIAN ASSISTANT

## 2019-06-11 PROCEDURE — 99213 OFFICE O/P EST LOW 20 MIN: CPT | Mod: 25 | Performed by: PHYSICIAN ASSISTANT

## 2019-06-11 RX ORDER — ALBUTEROL SULFATE 90 UG/1
2 AEROSOL, METERED RESPIRATORY (INHALATION) EVERY 4 HOURS PRN
Qty: 1 INHALER | Refills: 1 | Status: SHIPPED | OUTPATIENT
Start: 2019-06-11 | End: 2020-04-28

## 2019-06-11 RX ORDER — MONTELUKAST SODIUM 10 MG/1
10 TABLET ORAL AT BEDTIME
Qty: 90 TABLET | Refills: 3 | Status: SHIPPED | OUTPATIENT
Start: 2019-06-11 | End: 2020-03-31

## 2019-06-11 ASSESSMENT — ENCOUNTER SYMPTOMS
ABDOMINAL PAIN: 0
HEMATURIA: 0
PARESTHESIAS: 0
SORE THROAT: 0
NAUSEA: 0
JOINT SWELLING: 0
DYSURIA: 0
FREQUENCY: 0
WEAKNESS: 0
ARTHRALGIAS: 0
FEVER: 0
CONSTIPATION: 0
DIZZINESS: 0
EYE PAIN: 0
HEADACHES: 0
HEMATOCHEZIA: 0
COUGH: 0
MYALGIAS: 0
NERVOUS/ANXIOUS: 0
CHILLS: 0
SHORTNESS OF BREATH: 0
HEARTBURN: 0
PALPITATIONS: 0
DIARRHEA: 0

## 2019-06-11 ASSESSMENT — MIFFLIN-ST. JEOR: SCORE: 1707.25

## 2019-06-11 NOTE — PROGRESS NOTES
SUBJECTIVE:   CC: Walter Mejia is an 24 year old male who presents for preventative health visit.     Healthy Habits:     Getting at least 3 servings of Calcium per day:  Yes    Bi-annual eye exam:  NO    Dental care twice a year:  Yes    Sleep apnea or symptoms of sleep apnea:  None    Diet:  Regular (no restrictions)    Frequency of exercise:  2-3 days/week    Duration of exercise:  45-60 minutes    Taking medications regularly:  Yes    Medication side effects:  Not applicable    PHQ-2 Total Score: 0    Additional concerns today:  Yes (Skin concerns, post nasal dip/allergies)    Patient presents for annual physical  Feels well overall   Did start having a lot of PND around a year ago   -started on flonase; using once daily   -feels cngested; can feel draining   -has used montelukast in the past with good success    Today's PHQ-2 Score:   PHQ-2 ( 1999 [employer]) 6/11/2019   Q1: Little interest or pleasure in doing things 0   Q2: Feeling down, depressed or hopeless 0   PHQ-2 Score 0   Q1: Little interest or pleasure in doing things Not at all   Q2: Feeling down, depressed or hopeless Not at all   PHQ-2 Score 0       Abuse: Current or Past(Physical, Sexual or Emotional)- No  Do you feel safe in your environment? Yes    Social History     Tobacco Use     Smoking status: Never Smoker     Smokeless tobacco: Never Used   Substance Use Topics     Alcohol use: Yes     Comment: 1-2/week         Alcohol Use 6/11/2019   Prescreen: >3 drinks/day or >7 drinks/week? No       Last PSA: No results found for: PSA    Reviewed orders with patient. Reviewed health maintenance and updated orders accordingly - Yes  Labs reviewed in EPIC    Reviewed and updated as needed this visit by clinical staff  Tobacco  Allergies  Meds  Med Hx  Surg Hx  Fam Hx  Soc Hx        Reviewed and updated as needed this visit by Provider            Review of Systems   Constitutional: Negative for chills and fever.   HENT: Positive for congestion.  "Negative for ear pain, hearing loss and sore throat.    Eyes: Negative for pain and visual disturbance.   Respiratory: Negative for cough and shortness of breath.    Cardiovascular: Negative for chest pain, palpitations and peripheral edema.   Gastrointestinal: Negative for abdominal pain, constipation, diarrhea, heartburn, hematochezia and nausea.   Genitourinary: Negative for discharge, dysuria, frequency, genital sores, hematuria, impotence and urgency.   Musculoskeletal: Negative for arthralgias, joint swelling and myalgias.   Skin: Positive for rash.   Neurological: Negative for dizziness, weakness, headaches and paresthesias.   Psychiatric/Behavioral: Negative for mood changes. The patient is not nervous/anxious.        OBJECTIVE:   /70   Pulse 69   Temp 97.9  F (36.6  C) (Tympanic)   Resp 18   Ht 1.791 m (5' 10.5\")   Wt 70.8 kg (156 lb 1.6 oz)   SpO2 97%   BMI 22.08 kg/m      Physical Exam  GENERAL: healthy, alert and no distress  EYES: Eyes grossly normal to inspection, PERRL and conjunctivae and sclerae normal  HENT: normal cephalic/atraumatic, ear canals and TM's normal, nose and mouth without ulcers or lesions and oropharynx raw  RESP: lungs clear to auscultation - no rales, rhonchi or wheezes  CV: regular rate and rhythm, normal S1 S2, no S3 or S4, no murmur, click or rub, no peripheral edema and peripheral pulses strong  ABDOMEN: soft, nontender, no hepatosplenomegaly, no masses and bowel sounds normal  MS: no gross musculoskeletal defects noted, no edema  SKIN: there is a small scaly rash along the knuckles on some of the left hand digits.   NEURO: Normal strength and tone, mentation intact and speech normal  PSYCH: mentation appears normal, affect normal/bright    Diagnostic Test Results:  Labs reviewed in Epic    ASSESSMENT/PLAN:   1. Encounter for routine adult health examination with abnormal findings  Reviewed personal and family history. Reviewed age appropriate screenings. " "Recommended any needed vaccinations.  He had labs at his physical last year that looked excellent. Will not plan to repeat this year.    2. Mild intermittent asthma without complication  3. Seasonal allergic rhinitis, unspecified trigger  PND and allergies seem to be worse over the past year despite flonase. Using inhaler a bit more. Has had success previously on singuair. Ok to refill. Will recommend using flonase twice daily as well  - albuterol (PROAIR HFA/PROVENTIL HFA/VENTOLIN HFA) 108 (90 Base) MCG/ACT inhaler; Inhale 2 puffs into the lungs every 4 hours as needed for shortness of breath / dyspnea or wheezing  Dispense: 1 Inhaler; Refill: 1  - montelukast (SINGULAIR) 10 MG tablet; Take 1 tablet (10 mg) by mouth At Bedtime  Dispense: 90 tablet; Refill: 3    4. Dyshidrotic eczema  Discussion re treatment. Continue topical steroids      COUNSELING:   Reviewed preventive health counseling, as reflected in patient instructions    Estimated body mass index is 22.08 kg/m  as calculated from the following:    Height as of this encounter: 1.791 m (5' 10.5\").    Weight as of this encounter: 70.8 kg (156 lb 1.6 oz).          reports that he has never smoked. He has never used smokeless tobacco.      Counseling Resources:  ATP IV Guidelines  Pooled Cohorts Equation Calculator  FRAX Risk Assessment  ICSI Preventive Guidelines  Dietary Guidelines for Americans, 2010  USDA's MyPlate  ASA Prophylaxis  Lung CA Screening    Cortez Webb PA-C  [facility] [place]  "

## 2019-06-12 ASSESSMENT — ASTHMA QUESTIONNAIRES: ACT_TOTALSCORE: 22

## 2019-12-16 ENCOUNTER — OFFICE VISIT (OUTPATIENT)
Dept: URGENT CARE | Facility: URGENT CARE | Age: 24
End: 2019-12-16
Payer: COMMERCIAL

## 2019-12-16 VITALS
RESPIRATION RATE: 12 BRPM | HEART RATE: 100 BPM | TEMPERATURE: 98.8 F | DIASTOLIC BLOOD PRESSURE: 76 MMHG | SYSTOLIC BLOOD PRESSURE: 122 MMHG | OXYGEN SATURATION: 96 %

## 2019-12-16 DIAGNOSIS — J22 LOWER RESP. TRACT INFECTION: Primary | ICD-10-CM

## 2019-12-16 LAB
DEPRECATED S PYO AG THROAT QL EIA: NORMAL
SPECIMEN SOURCE: NORMAL

## 2019-12-16 PROCEDURE — 87880 STREP A ASSAY W/OPTIC: CPT | Performed by: FAMILY MEDICINE

## 2019-12-16 PROCEDURE — 87081 CULTURE SCREEN ONLY: CPT | Performed by: PHYSICIAN ASSISTANT

## 2019-12-16 PROCEDURE — 99213 OFFICE O/P EST LOW 20 MIN: CPT | Performed by: PHYSICIAN ASSISTANT

## 2019-12-16 RX ORDER — AZITHROMYCIN 250 MG/1
TABLET, FILM COATED ORAL
Qty: 6 TABLET | Refills: 0 | Status: SHIPPED | OUTPATIENT
Start: 2019-12-16 | End: 2020-03-31

## 2019-12-16 NOTE — LETTER
[facility]  [address]  [address]  [place] [address]  Phone: [phone]  Fax: [phone]    December 16, 2019        Walter Mejia  [address] [place] [address]  [place] [address]          To whom it may concern:    RE: Walter Mejia    Patient was seen and treated today at our clinic. Please excuse from work 12/11/2019 - 12/16/2019.     Please contact me for questions or concerns.      Sincerely,        Vera Gill PA-C

## 2019-12-16 NOTE — PROGRESS NOTES
SUBJECTIVE:   Walter Mejia is a 24 year old male presenting with a chief complaint of cough - productive.  Onset of symptoms was 1 week(s) ago.  Course of illness is worsening.    Severity moderately severe  Current and Associated symptoms: chills, sweats, stuffy nose, cough - productive and sore throat  Treatment measures tried include Tylenol/Ibuprofen.  Predisposing factors include HX of asthma.  Endorses having chest congesetion  Denies HA, CP, pleuritic chest pain, abd pain, N/V/D, rash, or any other symptoms.     No past medical history on file.  Current Outpatient Medications   Medication Sig Dispense Refill     albuterol (PROAIR HFA/PROVENTIL HFA/VENTOLIN HFA) 108 (90 Base) MCG/ACT inhaler Inhale 2 puffs into the lungs every 4 hours as needed for shortness of breath / dyspnea or wheezing 1 Inhaler 1     Albuterol Sulfate 108 (90 Base) MCG/ACT AEPB        azithromycin (ZITHROMAX) 250 MG tablet Two tablets first day, then one tablet daily for four days. 6 tablet 0     FEXOFENADINE HCL PO        fluticasone (FLONASE) 50 MCG/ACT spray Spray 1 spray into both nostrils daily 1 Bottle 3     montelukast (SINGULAIR) 10 MG tablet Take 1 tablet (10 mg) by mouth At Bedtime 90 tablet 3     montelukast (SINGULAIR) 10 MG tablet Take 10 mg by mouth At Bedtime       Social History     Tobacco Use     Smoking status: Never Smoker     Smokeless tobacco: Never Used   Substance Use Topics     Alcohol use: Yes     Comment: 1-2/week       ROS:  Review of systems negative except as stated above.    OBJECTIVE:  /76   Pulse 100   Temp 98.8  F (37.1  C)   Resp 12   SpO2 96%   GENERAL APPEARANCE: healthy, alert and no distress  EYES: EOMI,  PERRL, conjunctiva clear  HENT: ear canals and TM's normal.  Nose and mouth without ulcers, erythema or lesions  NECK: supple, nontender, no lymphadenopathy  RESP: crackles appreciated in L lung base  CV: regular rates and rhythm, normal S1 S2, no murmur noted  NEURO: Normal strength and  tone, sensory exam grossly normal,  normal speech and mentation  SKIN: no suspicious lesions or rashes    Results for orders placed or performed in visit on 12/16/19   Strep, Rapid Screen     Status: None   Result Value Ref Range    Specimen Description Throat     Rapid Strep A Screen       NEGATIVE: No Group A streptococcal antigen detected by immunoassay, await culture report.       ASSESSMENT / PLAN:  1. Lower resp. tract infection  Crackles in left lung base concerning for pneumonia  Encouraged fluids and rest and abx as prescribed    - Strep, Rapid Screen  - Beta strep group A culture  - azithromycin (ZITHROMAX) 250 MG tablet; Two tablets first day, then one tablet daily for four days.  Dispense: 6 tablet; Refill: 0    Diagnosis and treatment plan was reviewed with patient and/or family.   We went over any labs or imaging. Discussed worsening symptoms or little to no relief despite treatment plan to follow-up with PCP or return to clinic.  Patient verbalizes understanding. All questions were addressed and answered.   Vera Gill PA-C

## 2019-12-17 LAB
BACTERIA SPEC CULT: NORMAL
SPECIMEN SOURCE: NORMAL

## 2020-03-30 ENCOUNTER — MYC MEDICAL ADVICE (OUTPATIENT)
Dept: PEDIATRICS | Facility: CLINIC | Age: 25
End: 2020-03-30

## 2020-03-30 NOTE — TELEPHONE ENCOUNTER
Per Dr. Webb's office visit note from 6/11/19:  2. Mild intermittent asthma without complication   3. Seasonal allergic rhinitis, unspecified trigger   PND and allergies seem to be worse over the past year despite flonase. Using inhaler a bit more. Has had success previously on singuair. Ok to refill. Will recommend using flonase twice daily as well   - albuterol (PROAIR HFA/PROVENTIL HFA/VENTOLIN HFA) 108 (90 Base) MCG/ACT inhaler; Inhale 2 puffs into the lungs every 4 hours as needed for shortness of breath / dyspnea or wheezing Dispense: 1 Inhaler; Refill: 1   - montelukast (SINGULAIR) 10 MG tablet; Take 1 tablet (10 mg) by mouth At Bedtime Dispense: 90 tablet; Refill: 3

## 2020-03-31 ENCOUNTER — E-VISIT (OUTPATIENT)
Dept: FAMILY MEDICINE | Facility: CLINIC | Age: 25
End: 2020-03-31
Payer: COMMERCIAL

## 2020-03-31 DIAGNOSIS — J30.2 SEASONAL ALLERGIC RHINITIS, UNSPECIFIED TRIGGER: Primary | ICD-10-CM

## 2020-03-31 PROCEDURE — 99421 OL DIG E/M SVC 5-10 MIN: CPT | Performed by: PHYSICIAN ASSISTANT

## 2020-04-28 ENCOUNTER — VIRTUAL VISIT (OUTPATIENT)
Dept: PEDIATRICS | Facility: CLINIC | Age: 25
End: 2020-04-28
Payer: COMMERCIAL

## 2020-04-28 DIAGNOSIS — J45.20 MILD INTERMITTENT ASTHMA WITHOUT COMPLICATION: ICD-10-CM

## 2020-04-28 DIAGNOSIS — L70.9 ACNE, UNSPECIFIED ACNE TYPE: Primary | ICD-10-CM

## 2020-04-28 PROCEDURE — 99213 OFFICE O/P EST LOW 20 MIN: CPT | Mod: GT | Performed by: INTERNAL MEDICINE

## 2020-04-28 RX ORDER — TRETINOIN 0.1 MG/G
GEL TOPICAL AT BEDTIME
Qty: 45 G | Refills: 1 | Status: SHIPPED | OUTPATIENT
Start: 2020-04-28

## 2020-04-28 RX ORDER — DOXYCYCLINE HYCLATE 100 MG
100 TABLET ORAL 2 TIMES DAILY
Qty: 28 TABLET | Refills: 0 | Status: SHIPPED | OUTPATIENT
Start: 2020-04-28 | End: 2020-06-15

## 2020-04-28 RX ORDER — ALBUTEROL SULFATE 90 UG/1
2 AEROSOL, METERED RESPIRATORY (INHALATION) EVERY 4 HOURS PRN
Qty: 3 INHALER | Refills: 3 | Status: SHIPPED | OUTPATIENT
Start: 2020-04-28

## 2020-04-28 NOTE — PROGRESS NOTES
"Walter Mejia is a 24 year old male who is being evaluated via a billable video visit.      The patient has been notified of following:     \"This video visit will be conducted via a call between you and your physician/provider. We have found that certain health care needs can be provided without the need for an in-person physical exam.  This service lets us provide the care you need with a video conversation.  If a prescription is necessary we can send it directly to your pharmacy.  If lab work is needed we can place an order for that and you can then stop by our lab to have the test done at a later time.    Video visits are billed at different rates depending on your insurance coverage.  Please reach out to your insurance provider with any questions.    If during the course of the call the physician/provider feels a video visit is not appropriate, you will not be charged for this service.\"    Patient has given verbal consent for Video visit? Yes    How would you like to obtain your AVS? Ac    Patient would like the video invitation sent by: Send to e-mail at: [email or web address]    Will anyone else be joining your video visit? No  Subjective     Walter Mejia is a 24 year old male who presents to clinic today for the following health issues:    HPI  Red spot      Duration: 1 year    Description (location/character/radiation): eye    Intensity:  mild    Accompanying signs and symptoms:     History (similar episodes/previous evaluation): History of acne.  Patient notes a persistent cluster of red bumps adjacent to his right eye near his right temple.  Estimates the lesion has been there for at least 9-12 months.  Denies associated pain discharge, may have increased slightly in size.    Precipitating or alleviating factors: None    Therapies tried and outcome: None      Video Start Time: 11:17 AM    Patient Active Problem List   Diagnosis     CARDIOVASCULAR SCREENING; LDL GOAL LESS THAN 160     Seasonal allergic " "rhinitis     Mild intermittent asthma without complication     Past Surgical History:   Procedure Laterality Date     EXCISE MASS LOWER EXTREMITY Left 6/26/2018    Procedure: EXCISE MASS LOWER EXTREMITY;  Excision Left lower Leg Mass;  Surgeon: Adalberto Stein MD;  Location: [facility] OR     TONSILLECTOMY  2008       Social History     Tobacco Use     Smoking status: Never Smoker     Smokeless tobacco: Never Used   Substance Use Topics     Alcohol use: Yes     Comment: 1-2/week     Family History   Problem Relation Age of Onset     Hyperlipidemia Father      Hypertension Maternal Grandmother      Diabetes Maternal Grandmother      Hyperlipidemia Paternal Grandfather      Substance Abuse Paternal Grandfather         ETOH     Breast Cancer Maternal Aunt      Coronary Artery Disease No family hx of      Colon Cancer No family hx of      Prostate Cancer No family hx of          Current Outpatient Medications   Medication Sig Dispense Refill     albuterol (PROAIR HFA/PROVENTIL HFA/VENTOLIN HFA) 108 (90 Base) MCG/ACT inhaler Inhale 2 puffs into the lungs every 4 hours as needed for shortness of breath / dyspnea or wheezing 3 Inhaler 3                             Albuterol Sulfate 108 (90 Base) MCG/ACT AEPB        fluticasone (FLONASE) 50 MCG/ACT spray Spray 1 spray into both nostrils daily 1 Bottle 3     Allergies   Allergen Reactions     Apples [Apple]      Augmentin      Upset stomach     Banana      Blueberries [Vaccinium Angustifolium]        Reviewed and updated as needed this visit by Provider             Objective    There were no vitals taken for this visit.  Estimated body mass index is 22.08 kg/m  as calculated from the following:    Height as of 6/11/19: 1.791 m (5' 10.5\").    Weight as of 6/11/19: 70.8 kg (156 lb 1.6 oz).  Physical Exam     GENERAL: healthy, alert and no distress  EYES: Eyes grossly normal to inspection, conjunctivae and sclerae normal  SKIN: Approximately 1 cm cluster of tiny erythematous papules " adjacent to lateral aspect of right eye, closer inspection one lesion appears pustular in addition patient has a few areas of facial scarring secondary to acne as well as a few patches of similar erythematous papules  PSYCH: mentation appears normal, affect normal/bright, judgement and insight intact, normal speech and appearance well-groomed          ASSESSMENT:      ICD-10-CM    1. Acne, unspecified acne type  L70.9 tretinoin (RETIN-A) 0.01 % external gel     doxycycline hyclate (VIBRA-TABS) 100 MG tablet     Acne with what appears to be a focal area of nodulocystic acne.  Did recommend a trial of tretinoin gel and a 2-week course of doxycycline.-Side effects reviewed/specifically sun sensitivity while on doxycycline     2. Mild intermittent asthma without complication  J45.20 albuterol (PROAIR HFA/PROVENTIL HFA/VENTOLIN HFA) 108 (90 Base) MCG/ACT inhaler     Refill     Video-Visit Details    Type of service:  Video Visit    Video End Time:11:29 AM    Originating Location (pt. Location): Home    Distant Location (provider location):  [facility]     Mode of Communication:  Video Conference via [employer]      Ezekiel Noble MD, MD

## 2020-04-28 NOTE — PATIENT INSTRUCTIONS
Acne       Apply Retin A cream each evening, wash off each morning  Doxycycline for 14 days      Use Cetaphil cleanser to wash face  Follow-up 2 weeks if no improvment      Patient Education     Controlling Adult or Adolescent Acne    You stand the best chance of controlling your acne if you follow your treatment plan. Be patient. Acne often takes months to improve, not days or weeks. Ask your healthcare provider when you can expect your skin to look better. If you don t see results by your goal date, call your healthcare provider. He or she may want to give you some other type of treatment.  Using skin care products and cosmetics  Besides following your treatment plan, take care in choosing skin care products and cosmetics. The following tips may help:    Choose gentle, oil-free soaps and facial cleansers.    Avoid harsh acne scrubs, cleansers, or astringents. These types of products can irritate your skin.    Ask your healthcare provider before buying over-the-counter acne treatments. Some of these, such as those with benzoyl peroxide or salicylic acid, can work. But they often have side effects, such as skin getting too dry with treatments that are too strong.    Read labels on makeup and moisturizers. Choose those that are water based and oil free. Look for the term noncomedogenic. It means that the product won t clog your pores.  Caring for your skin  The right skin care routine can help keep your skin healthy. To take good care of your skin, try these tips:    Gently wash your face or other affected skin twice a day with a mild cleanser. Using your fingertips, smooth the cleanser over your skin. Rinse your skin well. Pat it dry.    If your healthcare provider has approved any over-the-counter acne medicine, use as directed. Use it after you wash your skin. Apply the medicine to all areas where you get acne. Don t just treat acne you can see now. New blemishes may be in progress but not in view yet. Treat all  the skin.    Don t squeeze or pick blemishes. Acne blemishes may heal on their own. But squeezing can cause scarring. Scars will remain after acne blemishes heal.    Sponges, brushes, or other abrasive tools can irritate the skin. Avoid using them.    If you use soft sponges or cloths to apply your makeup, keep them clean.    Try not to touch your face.    If possible, avoid clothing and equipment that blocks or rubs the face.  Getting good results  Learning more about acne is the first step toward controlling this condition. Know that acne that s being treated often gets worse at first before it improves. But with proper treatment and skin care, you can manage your acne and feel better about your skin.   Date Last Reviewed: 2/1/2017 2000-2019 [employer]. [address] [place] [address], [place] [address]. All rights reserved. This information is not intended as a substitute for professional medical care. Always follow your healthcare professional's instructions.           Patient Education     Acne  Acne is an inflammatory condition of the skin. During adolescence (especially in boys) there is an increase in production of skin oils on the face, neck, chest, upper back, and upper arms. These oils can block hair follicles and allow an overgrowth of normal bacteria. This results in acne.  Mild acne causes whiteheads, blackheads, and pimples. More severe acne causes cysts and scarring. Acne usually clears up by your mid-20 s.  These factors can make acne worse:    Oil-based cosmetics    Heavy sweating    Frequent or hard scrubbing of the skin can irritate the acne lesions    Skin rubbing against helmets, shoulder pads, turtlenecks, and bra straps    Certain types of birth control pills  Home care  Here are some tips to help care for acne:  1. Treatments may include topical products (creams, lotions, or gels), oral antibiotics, and other medicines.  2. Follow the treatment plan advised by your healthcare  provider. It usually takes 2 to 3 months to see a result.  3. Switching from oil-based to water-based makeup may improve acne in girls.  Follow-up care  Follow up with your healthcare provider, or as advised. For more information:    American Academy of Dermatology  www.aad.org  When to seek medical advice  Call your healthcare provider right away if you have acne cysts that get larger or more painful.  Date Last Reviewed: 6/1/2016 2000-2019 [employer]. [address] [place] [address]. All rights reserved. This information is not intended as a substitute for professional medical care. Always follow your healthcare professional's instructions.

## 2020-05-25 DIAGNOSIS — J45.20 MILD INTERMITTENT ASTHMA WITHOUT COMPLICATION: ICD-10-CM

## 2020-05-25 DIAGNOSIS — J30.2 SEASONAL ALLERGIC RHINITIS, UNSPECIFIED TRIGGER: ICD-10-CM

## 2020-05-27 RX ORDER — MONTELUKAST SODIUM 10 MG/1
TABLET ORAL
Qty: 90 TABLET | Refills: 3 | OUTPATIENT
Start: 2020-05-27

## 2020-06-13 ENCOUNTER — MYC MEDICAL ADVICE (OUTPATIENT)
Dept: PEDIATRICS | Facility: CLINIC | Age: 25
End: 2020-06-13

## 2020-06-15 ENCOUNTER — E-VISIT (OUTPATIENT)
Dept: PEDIATRICS | Facility: CLINIC | Age: 25
End: 2020-06-15
Payer: COMMERCIAL

## 2020-06-15 DIAGNOSIS — J45.20 MILD INTERMITTENT ASTHMA WITHOUT COMPLICATION: Primary | ICD-10-CM

## 2020-06-15 PROCEDURE — 99421 OL DIG E/M SVC 5-10 MIN: CPT | Performed by: INTERNAL MEDICINE

## 2020-06-15 RX ORDER — MONTELUKAST SODIUM 10 MG/1
10 TABLET ORAL AT BEDTIME
Qty: 90 TABLET | Refills: 3 | Status: SHIPPED | OUTPATIENT
Start: 2020-06-15

## 2020-06-15 NOTE — TELEPHONE ENCOUNTER
Advised E-visit to discuss restarting medication, or trying a new medication, as well as to address asthma symptoms.     Promise Ortega RN   [facility] -- Triage Nurse       Bilateral Helical Rim Advancement Flap Text: The defect edges were debeveled with a #15 blade scalpel.  Given the location of the defect and the proximity to free margins (helical rim) a bilateral helical rim advancement flap was deemed most appropriate.  Using a sterile surgical marker, the appropriate advancement flaps were drawn incorporating the defect and placing the expected incisions between the helical rim and antihelix where possible.  The area thus outlined was incised through and through with a #15 scalpel blade.  With a skin hook and iris scissors, the flaps were gently and sharply undermined and freed up.

## 2020-06-16 ENCOUNTER — TELEPHONE (OUTPATIENT)
Dept: PEDIATRICS | Facility: CLINIC | Age: 25
End: 2020-06-16

## 2020-06-16 DIAGNOSIS — J45.20 MILD INTERMITTENT ASTHMA WITHOUT COMPLICATION: Primary | ICD-10-CM

## 2020-06-16 NOTE — TELEPHONE ENCOUNTER
Pt needs to call his insurance to see what is on his formulary comparable to the fluticasone    Keisha Del Castillo RN, BSN

## 2020-06-16 NOTE — TELEPHONE ENCOUNTER
Medication Question or Refill  Who is calling: Patient  What medication are you calling about (include dose and sig)?: fluticasone inhaler  Controlled Substance Agreement on file: No  Who prescribed the medication?: Dr Noble  Do you need a refill? No  When did you use the medication last? Has not started  Patient offered an appointment? No  Do you have any questions or concerns?  Yes: Pt had an Evisit with Dr Noble yesterday.  He prescribed fluticasone and it is not covered by his pharmacy.  Requested Pharmacy: [facility] to leave a detailed message?: Yes at Cell number on file:    Telephone Information:   Mobile [phone]

## 2020-06-17 ENCOUNTER — TELEPHONE (OUTPATIENT)
Dept: PEDIATRICS | Facility: CLINIC | Age: 25
End: 2020-06-17

## 2020-06-17 RX ORDER — FLUTICASONE PROPIONATE AND SALMETEROL XINAFOATE 230; 21 UG/1; UG/1
1 AEROSOL, METERED RESPIRATORY (INHALATION) 2 TIMES DAILY
Qty: 2 INHALER | Refills: 3 | Status: SHIPPED | OUTPATIENT
Start: 2020-06-17

## 2020-06-17 NOTE — TELEPHONE ENCOUNTER
Called patient and he will call his insurance company and find out what is on their formulary comparable to fluticasone, he will call us back with this information and then we can prescribe to him.     Zee Mari, CMA

## 2020-06-17 NOTE — TELEPHONE ENCOUNTER
This was what was sent, I wonder if they will start this with a prior authorization.     Starting one in separate encounter now.     Zee Mari, CMA

## 2020-06-17 NOTE — TELEPHONE ENCOUNTER
Patient called back and said his insurance covers the name brand Advair.     Please call patient back with any questions. Thanks!

## 2020-06-17 NOTE — TELEPHONE ENCOUNTER
Prior Authorization Not Needed per Insurance    Medication: fluticasone-salmeterol (ADVAIR) 250-50 MCG/DOSE inhaler  Insurance Company: [employer] - Phone [phone] Fax [phone]  Expected CoPay:      Pharmacy Filling the Rx: "[id number] - [place], [place] - [address] AT [address] [facility]  Pharmacy Notified: Yes  Patient Notified: Yes    Brand name Advair Diskus is [facility] preferred formulation of fluticasone/salmeterol diskus and does not require prior authorization for coverage. Patients will be able to receive brand name Advair Diskus for a generic co-pay. Pharmacy received paid claim and will notify patient when it is ready.

## 2021-01-15 ENCOUNTER — HEALTH MAINTENANCE LETTER (OUTPATIENT)
Age: 26
End: 2021-01-15

## 2021-01-26 NOTE — TELEPHONE ENCOUNTER
Needs of attention regarding:  -Depression  -Breast Cancer Screening  -Colon Cancer Screening  -Cervical Cancer Screening  -Wellness (Physical) Visit     Health Maintenance Topics with due status: Overdue       Topic Date Due    HF ACTION PLAN 1972    URINE DRUG SCREEN 1972    COLORECTAL CANCER SCREENING 01/22/1981    HIV SCREENING 01/22/1986    HEPATITIS C SCREENING 01/22/1989    MEDICARE ANNUAL WELLNESS VISIT 06/19/2013    MAMMO SCREENING 11/01/2014    Pneumococcal Vaccine: Pediatrics (0 to 5 Years) and At-Risk Patients (6 to 64 Years) 07/19/2016    PAP 01/20/2017    INFLUENZA VACCINE 09/01/2020    PHQ-9 10/14/2020     Health Maintenance Topics with due status: Due On       Topic Date Due    ZOSTER IMMUNIZATION 01/22/2021       Communication:  See Letter     Prior Authorization Retail Medication Request    Medication/Dose: fluticasone-salmeterol (ADVAIR) 250-50 MCG/DOSE inhaler  ICD code (if different than what is on RX):  J45.20  Previously Tried and Failed:  Flonase, Albuterol Solfate  Rationale:  Patient has mild intermittent asthma that needs treatment with Advair in conjunction with montelukast and an albuterol emergency inhaler.     Insurance Name:  [employer]   Insurance ID:  [id number]       Pharmacy Information (if different than what is on RX)  Name:  [facility] ([address] [place] [address])   Phone:  [phone]

## 2021-09-05 ENCOUNTER — HEALTH MAINTENANCE LETTER (OUTPATIENT)
Age: 26
End: 2021-09-05

## 2022-02-20 ENCOUNTER — HEALTH MAINTENANCE LETTER (OUTPATIENT)
Age: 27
End: 2022-02-20

## 2022-10-23 ENCOUNTER — HEALTH MAINTENANCE LETTER (OUTPATIENT)
Age: 27
End: 2022-10-23

## 2023-04-02 ENCOUNTER — HEALTH MAINTENANCE LETTER (OUTPATIENT)
Age: 28
End: 2023-04-02

## (undated) DEVICE — ESU GROUND PAD ADULT W/CORD E7507

## (undated) DEVICE — SU ETHILON 3-0 PS-2 18" 1669H

## (undated) DEVICE — LINEN FULL SHEET 5511

## (undated) DEVICE — PACK MINOR CUSTOM RIDGES SBA32RMRMA

## (undated) DEVICE — SOL ADH LIQUID BENZOIN SWAB 0.6ML C1544

## (undated) DEVICE — GLOVE PROTEXIS POWDER FREE SMT 7.5  2D72PT75X

## (undated) DEVICE — GLOVE PROTEXIS POWDER FREE 8.0 ORTHOPEDIC 2D73ET80

## (undated) DEVICE — SUCTION CANISTER MEDIVAC LINER 3000ML W/LID 65651-530

## (undated) DEVICE — BAG CLEAR TRASH 1.3M 39X33" P4040C

## (undated) DEVICE — LINEN HALF SHEET 5512

## (undated) DEVICE — LINEN TOWEL PACK X5 5464

## (undated) DEVICE — ESU ELEC BLADE 2.75" COATED/INSULATED E1455

## (undated) DEVICE — GOWN XLG DISP 9545

## (undated) DEVICE — SU VICRYL 3-0 PS-2 27" UND J427H

## (undated) DEVICE — SU VICRYL 3-0 SH 27" UND J416H

## (undated) DEVICE — DRAPE STERI APERATURE 15X15" 1020

## (undated) DEVICE — DECANTER VIAL 2006S

## (undated) DEVICE — PREP CHLORAPREP 26ML TINTED ORANGE  260815

## (undated) RX ORDER — BUPIVACAINE HYDROCHLORIDE 2.5 MG/ML
INJECTION, SOLUTION EPIDURAL; INFILTRATION; INTRACAUDAL
Status: DISPENSED
Start: 2018-06-26

## (undated) RX ORDER — LIDOCAINE HYDROCHLORIDE AND EPINEPHRINE 10; 10 MG/ML; UG/ML
INJECTION, SOLUTION INFILTRATION; PERINEURAL
Status: DISPENSED
Start: 2018-06-26